# Patient Record
Sex: MALE | Race: WHITE | NOT HISPANIC OR LATINO | Employment: FULL TIME | URBAN - METROPOLITAN AREA
[De-identification: names, ages, dates, MRNs, and addresses within clinical notes are randomized per-mention and may not be internally consistent; named-entity substitution may affect disease eponyms.]

---

## 2024-11-14 ENCOUNTER — OFFICE VISIT (OUTPATIENT)
Dept: SLEEP MEDICINE | Facility: CLINIC | Age: 44
End: 2024-11-14
Payer: COMMERCIAL

## 2024-11-14 VITALS
HEIGHT: 70 IN | TEMPERATURE: 98.5 F | SYSTOLIC BLOOD PRESSURE: 124 MMHG | DIASTOLIC BLOOD PRESSURE: 72 MMHG | HEART RATE: 92 BPM | OXYGEN SATURATION: 97 % | WEIGHT: 303 LBS | BODY MASS INDEX: 43.38 KG/M2

## 2024-11-14 DIAGNOSIS — R06.83 SNORING: Primary | ICD-10-CM

## 2024-11-14 DIAGNOSIS — G47.10 HYPERSOMNOLENCE: ICD-10-CM

## 2024-11-14 DIAGNOSIS — F17.218 CIGARETTE NICOTINE DEPENDENCE WITH OTHER NICOTINE-INDUCED DISORDER: ICD-10-CM

## 2024-11-14 DIAGNOSIS — E66.813 CLASS 3 OBESITY: ICD-10-CM

## 2024-11-14 RX ORDER — ROSUVASTATIN CALCIUM 10 MG/1
TABLET, COATED ORAL
COMMUNITY
Start: 2024-08-22

## 2024-11-14 RX ORDER — CARIPRAZINE 3 MG/1
CAPSULE, GELATIN COATED ORAL
COMMUNITY
Start: 2024-11-11

## 2024-11-14 RX ORDER — TIRZEPATIDE 2.5 MG/.5ML
2.5 INJECTION, SOLUTION SUBCUTANEOUS WEEKLY
COMMUNITY

## 2024-11-14 NOTE — PROGRESS NOTES
New Sleep Patient Office Visit      Patient Name: Luciano Salcedo    Referring Physician: Mony Ruffin APRN    Chief Complaint:    Chief Complaint   Patient presents with    Sleeping Problem       History of Present Illness: Luciano Salcedo is a 44 y.o. male who is here today to establish care with Sleep Medicine.     Pleasant 44-year-old male coming here for initial evaluation.  Patient states that he has dyslipidemia, depression, obesity and has been having problem with snoring for long time.  He states that he generally goes to bed around 10:00 in the evening takes him an hour or 2 to go to sleep.  He sometimes listen to podcast or read a book and once asleep he wakes up couple of times during the night.  Sleeps about 6 hours but does not feel fully rested.  Occasionally he will inadvertently go to sleep during the daytime but generally does not take any naps.  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  He does sweat excessively during the night as well.  He also has problem with grinding teeth and he was placed on mouthguard but states that he could not use it as it was inconvenient and causing more pain and discomfort so he stopped using it.  Denies any restless leg symptoms.  Denies any REM behavior disorder.  Denies any other parasomnias.  Denies any driving problems or sleep-related accidents.  Denies any history of head injury or head trauma.  Denies any GERD symptoms.  Denies any leg edema.  Denies any sleep paralysis or hallucinations or cataplexy symptoms.    Patient states that he has been started on Zepbound last week for weight loss and he just darted it few days ago and he is hoping to lose weight with that.    Patient does smoke 1 pack a day has been doing for 15 years.  Counseled on quitting smoking.  Does drink alcohol occasionally.  Marijuana maybe once or twice a week.  Drinks 1 cup of coffee a day.    Further sleep history is as below.    [place] Scale: 3/24    Estimated average  amount of sleep per night: 6  Number of times  he wakes up at night: 2  Difficulty falling back asleep: no  It usually takes 60 minutes to go to sleep.  He feels sleepy upon waking up: yes  Rotating or night shift work: no    Drowsiness/Sleepiness:  He exhibits the following:  excessive daytime fatigue    Snoring/Breathing:  He exhibits the following:  loud snoring  snores in all sleep positions    Reflux:  He describes the following:  NA    Narcolepsy:  He exhibits the following:  none    RLS/PLMs:  He describes the following:  none    Insomnia:  He describes the following:  frequent awakenings    Parasomnia:  He exhibits the following:  NA    Weight:  Weight change in the last year:  Stable    Subjective      Review of Systems:   Review of Systems   Constitutional:  Positive for fatigue.   HENT:  Positive for drooling.    Respiratory: Negative.     Cardiovascular: Negative.    Gastrointestinal: Negative.    Endocrine: Negative.    Musculoskeletal: Negative.    Skin: Negative.    Neurological: Negative.    Hematological: Negative.    Psychiatric/Behavioral: Negative.     All other systems reviewed and are negative.      Past Medical History: No past medical history on file.    Past Surgical History: No past surgical history on file.    Family History: No family history on file.    Social History:   Social History     Socioeconomic History    Marital status: Single   Tobacco Use    Smoking status: Every Day     Current packs/day: 1.00     Average packs/day: 1 pack/day for 15.0 years (15.0 ttl pk-yrs)     Types: Cigarettes     Start date: 11/14/2009    Smokeless tobacco: Never   Vaping Use    Vaping status: Never Used   Substance and Sexual Activity    Alcohol use: Defer    Drug use: Never    Sexual activity: Defer       Medications:     Current Outpatient Medications:     rosuvastatin (CRESTOR) 10 MG tablet, , Disp: , Rfl:     Tirzepatide-Weight Management (Zepbound) 2.5 MG/0.5ML solution auto-injector, Inject 0.5  "mL under the skin into the appropriate area as directed 1 (One) Time Per Week., Disp: , Rfl:     Vraylar 3 MG capsule capsule, , Disp: , Rfl:     Allergies:   No Known Allergies    Objective     Physical Exam:  Vital Signs:   Vitals:    11/14/24 1054   BP: 124/72   Pulse: 92   Temp: 98.5 °F (36.9 °C)   TempSrc: Infrared   SpO2: 97%   Weight: (!) 137 kg (303 lb)   Height: 177.8 cm (70\")     Body mass index is 43.48 kg/m².    Physical Exam  Vitals and nursing note reviewed.   Constitutional:       General: He is not in acute distress.     Appearance: He is well-developed. He is not diaphoretic.   HENT:      Head: Normocephalic and atraumatic.      Comments: Mallampati 3 airway  Neck:      Thyroid: No thyromegaly.      Trachea: No tracheal deviation.   Cardiovascular:      Rate and Rhythm: Normal rate and regular rhythm.      Heart sounds: Normal heart sounds. No murmur heard.     No friction rub. No gallop.   Pulmonary:      Effort: Pulmonary effort is normal. No respiratory distress.      Breath sounds: Normal breath sounds. No wheezing or rales.   Musculoskeletal:         General: No tenderness.      Cervical back: Neck supple.      Right lower leg: No edema.      Left lower leg: No edema.   Neurological:      Mental Status: He is alert and oriented to person, place, and time.   Psychiatric:         Behavior: Behavior normal.         Thought Content: Thought content normal.         Judgment: Judgment normal.         Results Review:   I reviewed the patient's new clinical results.  No results found for: \"TSH\"    Patient apparently is working with primary care regarding thyroid management he did some blood work earlier today.    Assessment / Plan      Assessment:   Problem List Items Addressed This Visit    None  Visit Diagnoses       Snoring    -  Primary    Relevant Orders    Home Sleep Study    Hypersomnolence        Relevant Orders    Home Sleep Study    Class 3 obesity        Cigarette nicotine dependence with " other nicotine-induced disorder                  Plan:   1.  Patient presented with complains of snoring, fragmented and nonrestorative sleep along with excessive daytime fatigue and sleepiness.  Patient is class III obese with Mallampati 3 airway. All this put him at high risk of sleep disordered breathing.  Discussed at length about pathophysiology of sleep apnea.  Discussed side effects of untreated sleep apnea including poor quality sleep, cardiovascular, neurologic and metabolic side effects also discussed.  Further diagnostic testing recommended.  Patient is amenable to proceed with further testing and treatment as needed.  We discussed home study versus in lab study.  Patient is amenable to proceeding with home-based testing after our discussion currently.  We will set him up for that and follow-up closely after.  Discussed the caveat of missing mild sleep apnea with home sleep study which may necessitate in lab polysomnogram.  Patient verbalized understanding.     2.  If found to have significant sleep apnea available treatment options discussed including CPAP therapy, oral appliance, surgical options.  Weight loss as a treatment option for sleep apnea also discussed and counseled.     3.  Increasing activity advised.      4.  Patient is working on weight loss with stepdown.  Hopefully that will help him.  Importance of weight loss discussed at length.    5.  Smoking cessation counseled extensively.  Patient understands the importance of that.    Thank you for allowing me to participate in the care of this patient.  Will follow him closely.      Follow Up:   After HST    Discussed plan of care in detail with patient today. Patient verbally understands and agrees. I spent 45 minutes on this date of service. This time includes time spent by me in the following activities:preparing for the visit, reviewing tests, obtaining and/or reviewing a separately obtained history, performing a medically appropriate  examination, counseling the patient/, ordering tests, or procedures, and/or documenting information in the medical record. This time excludes other separate billable services such as interpretation of tests or procedures, if applicable.        Buck Hassan MD  Pulmonary Critical Care and Sleep Medicine

## 2025-01-21 ENCOUNTER — HOSPITAL ENCOUNTER (OUTPATIENT)
Dept: SLEEP MEDICINE | Facility: HOSPITAL | Age: 45
Discharge: HOME | End: 2025-01-21
Admitting: INTERNAL MEDICINE
Payer: COMMERCIAL

## 2025-01-21 VITALS — HEIGHT: 70 IN | BODY MASS INDEX: 43.24 KG/M2 | WEIGHT: 302.03 LBS

## 2025-01-21 DIAGNOSIS — G47.10 HYPERSOMNOLENCE: ICD-10-CM

## 2025-01-21 DIAGNOSIS — R06.83 SNORING: ICD-10-CM

## 2025-01-21 PROCEDURE — 95800 SLP STDY UNATTENDED: CPT

## 2025-01-22 DIAGNOSIS — G47.33 OSA (OBSTRUCTIVE SLEEP APNEA): Primary | ICD-10-CM

## 2025-01-22 DIAGNOSIS — R06.83 SNORING: ICD-10-CM

## 2025-01-27 ENCOUNTER — TELEPHONE (OUTPATIENT)
Dept: SLEEP MEDICINE | Facility: CLINIC | Age: 45
End: 2025-01-27
Payer: COMMERCIAL

## 2025-04-08 ENCOUNTER — OFFICE VISIT (OUTPATIENT)
Dept: SLEEP MEDICINE | Facility: CLINIC | Age: 45
End: 2025-04-08
Payer: COMMERCIAL

## 2025-04-08 VITALS
DIASTOLIC BLOOD PRESSURE: 82 MMHG | HEIGHT: 70 IN | HEART RATE: 105 BPM | OXYGEN SATURATION: 96 % | BODY MASS INDEX: 38.65 KG/M2 | WEIGHT: 270 LBS | SYSTOLIC BLOOD PRESSURE: 132 MMHG | TEMPERATURE: 98.6 F

## 2025-04-08 DIAGNOSIS — G47.33 OSA (OBSTRUCTIVE SLEEP APNEA): Primary | ICD-10-CM

## 2025-04-08 PROCEDURE — 99213 OFFICE O/P EST LOW 20 MIN: CPT | Performed by: NURSE PRACTITIONER

## 2025-04-08 NOTE — PROGRESS NOTES
Chief Complaint:   Chief Complaint   Patient presents with    Follow-up    Sleep Apnea       HPI:    Luciano Salcedo is a 45 y.o. male here for follow-up of sleep apnea.    Pleasant 45-year-old male who presented here for initial evaluation on 11/14/2024.  Patient stated that he has dyslipidemia, depression, obesity and has been having problem with snoring for long time.  He stated that he generally goes to bed around 10:00 in the evening takes him an hour or 2 to go to sleep.  He sometimes listen to podcast or read a book and once asleep he wakes up couple of times during the night.  Sleeps about 6 hours but did not feel fully rested.  Occasionally he would inadvertently go to sleep during the daytime but generally does not take any naps.  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  He does sweat excessively during the night as well.  He also has problem with grinding teeth and he was placed on mouthguard but states that he could not use it as it was inconvenient and causing more pain and discomfort so he stopped using it.  Denies any restless leg symptoms.  Denies any REM behavior disorder.  Denies any other parasomnias.  Denies any driving problems or sleep-related accidents.  Denies any history of head injury or head trauma.  Denies any GERD symptoms.  Denies any leg edema.  Denies any sleep paralysis or hallucinations or cataplexy symptoms.    Patient had a sleep study 1/21/2025 that did show mild obstructive sleep apnea and he did initiate CPAP therapy.  Patient states he can definitely tell he is more rested when wearing CPAP.  He is sleeping 6 to 8 hours nightly and goes to sleep within 1 hour.  Patient does not get up during the night.  Patient has an [place] score of 5/24.  Patient is doing well with CPAP therapy without concern or complaint and will continue therapy.    Current medications are:   Current Outpatient Medications:     rosuvastatin (CRESTOR) 10 MG tablet, , Disp: , Rfl:     Tirzepatide-Weight  "Management (Zepbound) 2.5 MG/0.5ML solution auto-injector, Inject 0.5 mL under the skin into the appropriate area as directed 1 (One) Time Per Week., Disp: , Rfl:     Vraylar 3 MG capsule capsule, , Disp: , Rfl: .      The patient's relevant past medical, surgical, family and social history were reviewed and updated in Epic as appropriate.       Review of Systems   HENT:  Positive for drooling.    Eyes:  Positive for visual disturbance.   Respiratory:  Positive for apnea.    Psychiatric/Behavioral:  Positive for sleep disturbance.    All other systems reviewed and are negative.        Objective:    Physical Exam  Constitutional:       Appearance: Normal appearance.   HENT:      Head: Normocephalic and atraumatic.      Mouth/Throat:      Comments: Mallampati 3 anatomy  Cardiovascular:      Rate and Rhythm: Regular rhythm. Tachycardia present.   Pulmonary:      Effort: Pulmonary effort is normal.      Breath sounds: Normal breath sounds.   Skin:     General: Skin is warm and dry.   Neurological:      Mental Status: He is alert and oriented to person, place, and time.   Psychiatric:         Mood and Affect: Mood normal.         Behavior: Behavior normal.         Thought Content: Thought content normal.         Judgment: Judgment normal.       /82   Pulse 105   Temp 98.6 °F (37 °C)   Ht 177.8 cm (70\")   Wt 122 kg (270 lb)   SpO2 96%   BMI 38.74 kg/m²     CPAP Report  43/58 days of use  Greater than 4-hour use 72%  Setting 4-20  95th percentile pressure 12.1  AHI of 0.3    The patient continues to use and benefit from CPAP therapy.    ASSESSMENT/PLAN    Diagnoses and all orders for this visit:    1. KAMILLA (obstructive sleep apnea) (Primary)  -     PAP Therapy        Refill supplies x 1 year.  Return to clinic 1 year or sooner as symptoms warrant.    Signed by  KASSANDRA Willard    April 8, 2025      CC: Mony Ruffin APRN         No ref. provider found      "